# Patient Record
(demographics unavailable — no encounter records)

---

## 2024-11-26 NOTE — DISCUSSION/SUMMARY
[Coronary Artery Disease] : coronary artery disease [CABG] : coronary artery bypass graft [Essential Hypertension] : essential hypertension [Low Sodium Diet] : low sodium diet [NSAIDs Avoidance] : non-steroidal anti-inflammatory drugs avoidance [Mitral Regurgitation] : mitral regurgitation [Stable] : stable [None] : There are no changes in medication management [Sodium Restriction] : sodium restriction [Patient] : the patient

## 2025-04-17 NOTE — HISTORY OF PRESENT ILLNESS
[Preoperative Visit] : for a medical evaluation prior to surgery [Scheduled Procedure ___] : a [unfilled] [Date of Surgery ___] : on [unfilled] [Surgeon Name ___] : surgeon: [unfilled] [Good] : Good [Diabetes] : diabetes [Cardiovascular Disease] : cardiovascular disease [Prior Anesthesia] : Prior anesthesia [Fever] : no fever [Chills] : no chills [Fatigue] : no fatigue [Chest Pain] : no chest pain [Cough] : no cough [Dyspnea] : no dyspnea [Dysuria] : no dysuria [Urinary Frequency] : no urinary frequency [Nausea] : no nausea [Vomiting] : no vomiting [Diarrhea] : no diarrhea [Abdominal Pain] : no abdominal pain [Easy Bruising] : no easy bruising [Lower Extremity Swelling] : no lower extremity swelling [Poor Exercise Tolerance] : no poor exercise tolerance [Pulmonary Disease] : no pulmonary disease [Nicotine Dependence] : no nicotine dependence [GI Disease] : no gastrointestinal disease [Sleep Apnea] : no sleep apnea [Thromboembolic Problems] : no thromboembolic problems [Clotting Disorder] : no clotting disorder [Bleeding Disorder] : no bleeding disorder [Transfusion Reaction] : no transfusion reaction [Impaired Immunity] : no impaired immunity [Prev Anesthesia Reaction] : no previous anesthesia reaction [FreeTextEntry1] :  Denies Chest Pain, SOB, Dizziness, Leg Edema, Orthopnea, PND, Palpitations, Syncope, BREWER, Diaphoresis

## 2025-04-17 NOTE — PHYSICAL EXAM
[General Appearance - Well Developed] : well developed [Normal Appearance] : normal appearance [Well Groomed] : well groomed [General Appearance - Well Nourished] : well nourished [No Deformities] : no deformities [General Appearance - In No Acute Distress] : no acute distress [Normal Conjunctiva] : the conjunctiva exhibited no abnormalities [Eyelids - No Xanthelasma] : the eyelids demonstrated no xanthelasmas [Normal Oral Mucosa] : normal oral mucosa [No Oral Pallor] : no oral pallor [No Oral Cyanosis] : no oral cyanosis [Normal Jugular Venous A Waves Present] : normal jugular venous A waves present [Normal Jugular Venous V Waves Present] : normal jugular venous V waves present [No Jugular Venous Solomon A Waves] : no jugular venous solomon A waves [Respiration, Rhythm And Depth] : normal respiratory rhythm and effort [Exaggerated Use Of Accessory Muscles For Inspiration] : no accessory muscle use [Auscultation Breath Sounds / Voice Sounds] : lungs were clear to auscultation bilaterally [Heart Rate And Rhythm] : heart rate and rhythm were normal [Heart Sounds] : normal S1 and S2 [Abdomen Soft] : soft [Abdomen Tenderness] : non-tender [Abdomen Mass (___ Cm)] : no abdominal mass palpated [Abnormal Walk] : normal gait [Gait - Sufficient For Exercise Testing] : the gait was sufficient for exercise testing [Nail Clubbing] : no clubbing of the fingernails [Cyanosis, Localized] : no localized cyanosis [Petechial Hemorrhages (___cm)] : no petechial hemorrhages [Skin Color & Pigmentation] : normal skin color and pigmentation [] : no rash [No Venous Stasis] : no venous stasis [Skin Lesions] : no skin lesions [No Skin Ulcers] : no skin ulcer [No Xanthoma] : no  xanthoma was observed [Oriented To Time, Place, And Person] : oriented to person, place, and time [Affect] : the affect was normal [Mood] : the mood was normal [No Anxiety] : not feeling anxious

## 2025-04-17 NOTE — END OF VISIT
[FreeTextEntry3] :  All medical records entered made by the scribe were at my Dr. Rafael Dickinson, discretion and personally dictated by me on Apr 17 2025  2:00PM. I have reviewed the chart and agree that the record accuracy reflects my personal performance of the history, physical exam, assessment and plan. I have also personally directed, reviewed and agreed with the chart.

## 2025-04-17 NOTE — DISCUSSION/SUMMARY
[Procedure Intermediate Risk] : the procedure risk is intermediate [Patient Intermediate Risk] : the patient is an intermediate risk [Optimized for Surgery] : the patient is optimized for surgery [As per surgery] : as per surgery [Continue] : Continue medications as currently directed [FreeTextEntry1] :   Patient advised to discontinue Xarelto 2 days before the procedure and on the day of as per guidelines published on [employer]. Resumption of Xarelto is at discretion of the surgeon. Blood work reviewed with patient. Maintain a low caloric diet, low sodium, low cholesterol diet and exercise discussed in detail.